# Patient Record
Sex: MALE | Race: WHITE | NOT HISPANIC OR LATINO | Employment: FULL TIME
[De-identification: names, ages, dates, MRNs, and addresses within clinical notes are randomized per-mention and may not be internally consistent; named-entity substitution may affect disease eponyms.]

---

## 2017-02-24 ENCOUNTER — RECORDS - HEALTHEAST (OUTPATIENT)
Dept: ADMINISTRATIVE | Facility: OTHER | Age: 47
End: 2017-02-24

## 2017-05-19 ENCOUNTER — OFFICE VISIT - HEALTHEAST (OUTPATIENT)
Dept: FAMILY MEDICINE | Facility: CLINIC | Age: 47
End: 2017-05-19

## 2017-05-19 DIAGNOSIS — M24.159 LABRAL TEAR OF HIP, DEGENERATIVE: ICD-10-CM

## 2017-05-19 DIAGNOSIS — Z01.818 PRE-OP EVALUATION: ICD-10-CM

## 2017-05-19 DIAGNOSIS — Z13.220 SCREENING FOR LIPID DISORDERS: ICD-10-CM

## 2017-05-19 LAB
CHOLEST SERPL-MCNC: 287 MG/DL
FASTING STATUS PATIENT QL REPORTED: NO
HDLC SERPL-MCNC: 47 MG/DL
LDLC SERPL CALC-MCNC: 189 MG/DL
TRIGL SERPL-MCNC: 255 MG/DL

## 2017-05-19 ASSESSMENT — MIFFLIN-ST. JEOR: SCORE: 1880.04

## 2017-05-22 ASSESSMENT — MIFFLIN-ST. JEOR: SCORE: 1850.55

## 2017-05-23 ENCOUNTER — SURGERY - HEALTHEAST (OUTPATIENT)
Dept: SURGERY | Facility: CLINIC | Age: 47
End: 2017-05-23

## 2017-05-23 ENCOUNTER — ANESTHESIA - HEALTHEAST (OUTPATIENT)
Dept: SURGERY | Facility: CLINIC | Age: 47
End: 2017-05-23

## 2017-06-07 ENCOUNTER — RECORDS - HEALTHEAST (OUTPATIENT)
Dept: ADMINISTRATIVE | Facility: OTHER | Age: 47
End: 2017-06-07

## 2017-07-12 ENCOUNTER — RECORDS - HEALTHEAST (OUTPATIENT)
Dept: ADMINISTRATIVE | Facility: OTHER | Age: 47
End: 2017-07-12

## 2017-08-16 ENCOUNTER — RECORDS - HEALTHEAST (OUTPATIENT)
Dept: ADMINISTRATIVE | Facility: OTHER | Age: 47
End: 2017-08-16

## 2021-05-30 VITALS — HEIGHT: 72 IN | WEIGHT: 210 LBS | BODY MASS INDEX: 28.44 KG/M2

## 2021-05-30 VITALS — BODY MASS INDEX: 30.8 KG/M2 | HEIGHT: 71 IN | WEIGHT: 220 LBS

## 2021-06-01 ENCOUNTER — RECORDS - HEALTHEAST (OUTPATIENT)
Dept: ADMINISTRATIVE | Facility: CLINIC | Age: 51
End: 2021-06-01

## 2021-06-10 NOTE — PROGRESS NOTES
Assessment/Plan:     1. Pre-op evaluation/ 2. Labral tear of hip, degenerative  Preoperative exam completed today for a left hip arthroscopically secondary to labral tear. Patient is low risk for complications related to planned procedure, would recommend proceeding as scheduled without further clinical clarification. Patient is asymptomatic in terms of chest pain or shortness of breath, no cardiac history. Labs as below are pending as of this dictation however will be available to review in epic.  Postoperative management of the patient will be completed by the surgeon.  - Hemoglobin  - Basic Metabolic Panel      3. Screening for lipid disorders  Patient has not had cholesterol screening completed in the past.  This will be completed today as well.  - Lipid Cascade      Subjective:     Scheduled Procedure: Left Hip Arthroscopy, pincer resection, labral repair, cam osteoplasty, capsular closure.   Surgery Date:  5/23/17  Surgery Location:  [facility]  Surgeon:  Dr. Marques    Symptoms started about 8 years ago with left hip pain. He had done symptomatic management and physical therapy. He got an MRI and was found to have a Labral tear. It was determined that surgery was necessary.     No current outpatient prescriptions on file.     No current facility-administered medications for this visit.        Allergies   Allergen Reactions     Penicillins Rash       Immunization History   Administered Date(s) Administered     Tdap 06/22/2013       Patient Active Problem List   Diagnosis     Rotator cuff syndrome, left       Past Medical History:   Diagnosis Date     DVT (deep venous thrombosis) 2009    no clotting disorders. Had a Stent placed and since removed. on blood thinners for 6 months.      Labral tear of hip, degenerative        Social History     Social History     Marital status: Single     Spouse name: N/A     Number of children: N/A     Years of education: N/A     Occupational History     Not on file.     Social  "History Main Topics     Smoking status: Former Smoker     Types: Cigarettes     Quit date: 7/1/2015     Smokeless tobacco: Not on file     Alcohol use Yes     Drug use: No     Sexual activity: Not on file     Other Topics Concern     Not on file     Social History Narrative       Past Surgical History:   Procedure Laterality Date     IVC FILTER RETRIEVAL       SURGERY SCROTAL / TESTICULAR  1977       History of Present Illness  Recent Health  Fever: no  Chills: no  Fatigue: no  Chest Pain: no  Cough: no  Dyspnea: no  Urinary Frequency: no  Nausea: no  Vomiting: no  Diarrhea: no  Abdominal Pain: no  Easy Bruising: no  Lower Extremity Swelling: no  Poor Exercise Tolerance: no    Most recent Health Maintenance Visit:  long time ago. Has had visits with PCP in the last 6 months     Pertinent History  Prior Anesthesia: yes  Previous Anesthesia Reaction:  no  Diabetes: no  Cardiovascular Disease: no  Pulmonary Disease: no  Renal Disease: no  GI Disease: no  Sleep Apnea: no  Thromboembolic Problems: no  Clotting Disorder: no  Bleeding Disorder: no  Transfusion Reaction: no history of a blood transfusion  Impaired Immunity: no  Steroid use in the last 6 months: no  Frequent Aspirin use: no    No family history of anesthesia reaction, MI, Aneurysm, sudden death, clotting disorder and bleeding disorder. Grandmother had a Stroke, lived to 95 years old. Grandparents lived into 90s.     Social history of patient does not wear denture or partial plates, there is no transfusion refusal and there are no concerns regarding care after surgery    After surgery, the patient plans to recover at home with family.    Review of Systems  A 12 point comprehensive review of systems was negative except as noted.          Objective:         Vitals:    05/19/17 1359   BP: 122/86   Pulse: 68   Resp: 16   Weight: 220 lb (99.8 kg)   Height: 5' 11\" (1.803 m)       Physical Exam:  General Appearance: Alert, cooperative, no distress, appears stated " age  Head: Normocephalic, without obvious abnormality, atraumatic  Eyes: PERRL, conjunctiva/corneas clear, EOM's intact  Ears: Normal TM's and external ear canals, both ears  Nose: Nares normal, septum midline,mucosa normal, no drainage  Throat: Lips, mucosa, and tongue normal; teeth and gums normal  Neck: Supple, symmetrical, trachea midline, no adenopathy;  thyroid: not enlarged, symmetric, no tenderness/mass/nodules; no carotid bruit or JVD  Back: Symmetric, no curvature, ROM normal, no CVA tenderness  Lungs: Clear to auscultation bilaterally, respirations unlabored  Heart: Regular rate and rhythm, S1 and S2 normal, no murmur, rub, or gallop,  Abdomen: Soft, non-tender, bowel sounds active all four quadrants,  no masses, no organomegaly  Genitourinary: Penis normal. Right testis is descended. Left testis is descended.   Musculoskeletal: Normal range of motion. No joint swelling or deformity.   Extremities: Extremities normal, atraumatic, no cyanosis or edema  Skin: Skin color, texture, turgor normal, no rashes or lesions  Lymph nodes: Cervical, supraclavicular, and axillary nodes normal  Neurologic: He is alert. He has normal reflexes.   Psychiatric: He has a normal mood and affect.

## 2021-06-10 NOTE — ANESTHESIA PREPROCEDURE EVALUATION
Anesthesia Evaluation      Patient summary reviewed   No history of anesthetic complications     Airway   Mallampati: II  Neck ROM: full   Pulmonary - negative ROS and normal exam    breath sounds clear to auscultation                         Cardiovascular - negative ROS and normal exam  Rhythm: regular  Rate: normal,         Neuro/Psych - negative ROS     Endo/Other - negative ROS      GI/Hepatic/Renal - negative ROS      Other findings: Hx DVT & IVC filter      Dental - normal exam                        Anesthesia Plan  Planned anesthetic: general endotracheal    ASA 2   Induction: intravenous   Anesthetic plan and risks discussed with: patient    Post-op plan: routine recovery

## 2021-06-10 NOTE — ANESTHESIA CARE TRANSFER NOTE
Last vitals:   Vitals:    05/23/17 1550   BP: 125/72   Pulse: 80   Resp: 14   Temp: 36.3  C (97.4  F)   SpO2: 100%     Patient's level of consciousness is drowsy  Spontaneous respirations: yes  Maintains airway independently: yes  Dentition unchanged: yes  Oropharynx: oropharynx clear of all foreign objects    QCDR Measures:  ASA# 20 - Surgical Safety Checklist: ASA20A - Safety Checks Done  PQRS# 430 - Adult PONV Prevention: 4558F - Pt received => 2 anti-emetic agents (different classes) preop & intraop  ASA# 8 - Peds PONV Prevention: NA - Not pediatric patient, not GA or 2 or more risk factors NOT present  PQRS# 424 - Leatha-op Temp Management: 4559F - At least one body temp DOCUMENTED => 35.5C or 95.9F within required timeframe  PQRS# 426 - PACU Transfer Protocol: - Transfer of care checklist used  ASA# 14 - Acute Post-op Pain: ASA14B - Patient did NOT experience pain >= 7 out of 10

## 2021-06-10 NOTE — ANESTHESIA POSTPROCEDURE EVALUATION
Patient: Andi Dawn  LEFT HIP ARTHROSCOPY, PINCER RESECTION, LABRAL REPAIR, CAM OSTEOPLASTY, CAPSULAR CLOSURE  Anesthesia type: general    Patient location: PACU  Last vitals:   Vitals:    05/23/17 1640   BP: 140/77   Pulse: 64   Resp: 16   Temp:    SpO2: 95%     Post vital signs: stable  Level of consciousness: awake and return to baseline  Post-anesthesia pain: pain controlled  Post-anesthesia nausea and vomiting: no  Pulmonary: unassisted, return to baseline  Cardiovascular: stable and blood pressure at baseline  Hydration: adequate  Anesthetic events: no    QCDR Measures:  ASA# 11 - Leatha-op Cardiac Arrest: ASA11B - Patient did NOT experience unanticipated cardiac arrest  ASA# 12 - Leatha-op Mortality Rate: ASA12B - Patient did NOT die  ASA# 13 - PACU Re-Intubation Rate: ASA13B - Patient did NOT require a new airway mgmt  ASA# 10 - Composite Anes Safety: ASA10A - No serious adverse event  ASA# 38 - New Corneal Injury: ASA38A - No new exposure keratitis or corneal abrasion in PACU    Additional Notes:

## 2021-09-01 ENCOUNTER — HOSPITAL ENCOUNTER (EMERGENCY)
Facility: HOSPITAL | Age: 51
Discharge: HOME | End: 2021-09-01
Attending: EMERGENCY MEDICINE | Admitting: EMERGENCY MEDICINE
Payer: COMMERCIAL

## 2021-09-01 VITALS
TEMPERATURE: 98 F | BODY MASS INDEX: 28.48 KG/M2 | HEART RATE: 98 BPM | RESPIRATION RATE: 16 BRPM | SYSTOLIC BLOOD PRESSURE: 160 MMHG | DIASTOLIC BLOOD PRESSURE: 82 MMHG | OXYGEN SATURATION: 97 % | WEIGHT: 210 LBS
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
## 2021-09-01 PROCEDURE — 99282 EMERGENCY DEPT VISIT SF MDM: CPT

## 2021-09-01 RX ORDER — CLINDAMYCIN HCL 300 MG
300 CAPSULE ORAL 3 TIMES DAILY
Qty: 21 CAPSULE | Refills: 0 | Status: SHIPPED | OUTPATIENT
Start: 2021-09-01 | End: 2021-09-08

## 2021-09-01 NOTE — ED TRIAGE NOTES
Here with family. Dog bit on abd about 20 min ago. Dog is known to Pt and UTD of vaccinations. Bandage is in place and bleeding is controlled.

## 2021-09-02 NOTE — ED PROVIDER NOTES
EMERGENCY DEPARTMENT ENCOUNTER     NAME: Andi Dawn   AGE: 50 year old male   YOB: 1970   MRN: [id number]   EVALUATION DATE & TIME: 9/1/2021  6:59 PM   PCP: Eduardo Martinez     Chief Complaint   Patient presents with     Dog Bite   :    FINAL IMPRESSION       1. Dog bite, initial encounter           ED COURSE & MEDICAL DECISION MAKING      Pertinent Labs & Imaging studies reviewed. (See chart for details)   50 year old male  presents to the Emergency Department for evaluation of a dog bite on his abdomen. Initial Vitals Reviewed. Initial exam notable for an early well-appearing male who has 2 superficial puncture wounds, 1 of which is only to the top layer of the skin and one is full-thickness through the skin but does not extend through the subcutaneous fat and I am not concerned for visceral injury.  Tetanus and dog's rabies are up-to-date.  We discussed the risks of wound closure and given that these are small we will irrigate, do dressings, and discharged with antibiotic prophylaxis but allow them to heal by secondary intention.  Patient was comfortable with this plan at time of discharge.    7:17 PM I performed my initial history and physical exam as well as discussed ED course and plan.         At the conclusion of the encounter I discussed the results of all of the tests and the disposition. The questions were answered. The patient or family acknowledged understanding and was agreeable with the care plan.         MEDICATIONS GIVEN IN THE EMERGENCY:   Medications - No data to display   NEW PRESCRIPTIONS STARTED AT TODAY'S ER VISIT   New Prescriptions    No medications on file     ================================================================   HISTORY OF PRESENT ILLNESS       Patient information was obtained from: Patient   Use of Intrepreter: N/A  Andi Dawn is a 50 year old male with no pertinent history  presents via private car for evaluation of a dog bite.    Patient reports with his family after getting bit by a dog on his upper middle abdomen 30 minutes ago(1845). He has since bandaged the bit and controlled the bleeding. Patient knows the dog and knows that it is rabies vaccinated. His tetanus is up to date (6/22/2013). He has a penicillin allergy. No other complaints at this time.     Shx: Patient is a former smoker and denies any illicit drug use.     ================================================================    REVIEW OF SYSTEMS       Review of Systems   Skin:        2 sites of puncture due to dog bit to his upper middle abdomen   All other systems reviewed and are negative.       PAST HISTORY     PAST MEDICAL HISTORY:   No past medical history on file.   PAST SURGICAL HISTORY:   Past Surgical History:   Procedure Laterality Date     IR MISCELLANEOUS PROCEDURE  12/15/2009     IR MISCELLANEOUS PROCEDURE  12/15/2009     IR MISCELLANEOUS PROCEDURE  12/16/2009     IR MISCELLANEOUS PROCEDURE  12/17/2009     IR MISCELLANEOUS PROCEDURE  12/17/2009     IR VENOUS PTA  12/17/2009     IVC FILTER RETRIEVAL       ID ARTHROSCOPY HIP W/LABRAL REPAIR Left 5/23/2017    Procedure: LEFT HIP ARTHROSCOPY, PINCER RESECTION, LABRAL REPAIR, CAM OSTEOPLASTY, CAPSULAR CLOSURE;  Surgeon: Parker Cooper MD;  Location: [facility];  Service: Orthopedics     SURGERY SCROTAL / TESTICULAR  1977      CURRENT MEDICATIONS:   enoxaparin (LOVENOX) 40 mg/0.4 mL syringe      ALLERGIES:   Allergies   Allergen Reactions     Penicillins Rash      FAMILY HISTORY:   Family History   Problem Relation Age of Onset     Diabetes Father      Cerebrovascular Disease Paternal Grandmother      Clotting Disorder No family hx of      Cancer No family hx of       SOCIAL HISTORY:   Social History     Socioeconomic History     Marital status: Single     Spouse name: Not on file     Number of children: Not on file     Years of education: Not on file     Highest education level: Not on file   Occupational  History     Not on file   Tobacco Use     Smoking status: Former Smoker     Types: Cigarettes, Cigarettes     Quit date: 2015     Years since quittin.1   Substance and Sexual Activity     Alcohol use: Not on file     Drug use: No     Sexual activity: Not on file   Other Topics Concern     Not on file   Social History Narrative     Not on file     Social Determinants of Health     Financial Resource Strain:      Difficulty of Paying Living Expenses:    Food Insecurity:      Worried About Running Out of Food in the Last Year:      Ran Out of Food in the Last Year:    Transportation Needs:      Lack of Transportation (Medical):      Lack of Transportation (Non-Medical):    Physical Activity:      Days of Exercise per Week:      Minutes of Exercise per Session:    Stress:      Feeling of Stress :    Social Connections:      Frequency of Communication with Friends and Family:      Frequency of Social Gatherings with Friends and Family:      Attends Buddhist Services:      Active Member of Clubs or Organizations:      Attends Club or Organization Meetings:      Marital Status:    Intimate Partner Violence:      Fear of Current or Ex-Partner:      Emotionally Abused:      Physically Abused:      Sexually Abused:         VITALS  Patient Vitals for the past 24 hrs:   BP Temp Temp src Pulse Resp SpO2 Weight   21 1816 (!) 160/82 98  F (36.7  C) Oral 98 16 97 % 95.3 kg (210 lb)        ================================================================    PHYSICAL EXAM     VITAL SIGNS: BP (!) 160/82   Pulse 98   Temp 98  F (36.7  C) (Oral)   Resp 16   Wt 95.3 kg (210 lb)   SpO2 97%   BMI 28.48 kg/m     Constitutional:  Awake, no acute distress   HENT:  Atraumatic, oropharynx without exudate or erythema, membranes moist  Lymph:  No adenopathy  Eyes: EOM intact, PERRL, no injection  Neck: Supple  Respiratory:  Clear to auscultation bilaterally, no wheezes or crackles   Cardiovascular:  Regular rate and rhythm,  single S1 and S2   GI:  Soft, nontender, nondistended, no rebound or guarding   Musculoskeletal:  Moves all extremities, no lower extremity edema, no deformities    Skin:  Warm, dry, 2 superficial puncture wounds to anterior abdomen, 1 cm in diameter  Neurologic:  Alert and oriented x3, no focal deficits noted       ================================================================  LAB       All pertinent labs reviewed and interpreted.   Labs Ordered and Resulted from Time of ED Arrival Up to the Time of Departure from the ED - No data to display     ===============================================================  RADIOLOGY       Reviewed all pertinent imaging. Please see official radiology report.   No orders to display         ================================================================  EKG         I have independently reviewed and interpreted the EKG(s) documented above.     ================================================================  PROCEDURES         I, Alex Mirza, am serving as a scribe to document services personally performed by Dr. Guerin based on my observation and the provider's statements to me. I, Guerita Guerin MD attest that Alex Mirza is acting in a scribe capacity, has observed my performance of the services and has documented them in accordance with my direction.   Guerita Guerin M.D.   Emergency Medicine   [facility] EMERGENCY DEPARTMENT  [address] [place] [id number]  [phone]  Dept: [phone]        Guerita Guerin MD  09/01/21 1957

## 2021-10-17 ENCOUNTER — HEALTH MAINTENANCE LETTER (OUTPATIENT)
Age: 51
End: 2021-10-17

## 2021-10-25 ENCOUNTER — APPOINTMENT (OUTPATIENT)
Dept: ULTRASOUND IMAGING | Facility: HOSPITAL | Age: 51
End: 2021-10-25
Attending: STUDENT IN AN ORGANIZED HEALTH CARE EDUCATION/TRAINING PROGRAM
Payer: COMMERCIAL

## 2021-10-25 ENCOUNTER — HOSPITAL ENCOUNTER (EMERGENCY)
Facility: HOSPITAL | Age: 51
Discharge: HOME | End: 2021-10-25
Attending: STUDENT IN AN ORGANIZED HEALTH CARE EDUCATION/TRAINING PROGRAM | Admitting: STUDENT IN AN ORGANIZED HEALTH CARE EDUCATION/TRAINING PROGRAM
Payer: COMMERCIAL

## 2021-10-25 VITALS
BODY MASS INDEX: 29.12 KG/M2 | OXYGEN SATURATION: 97 % | SYSTOLIC BLOOD PRESSURE: 127 MMHG | TEMPERATURE: 97.7 F | HEART RATE: 96 BPM | DIASTOLIC BLOOD PRESSURE: 87 MMHG | RESPIRATION RATE: 18 BRPM | HEIGHT: 72 IN | WEIGHT: 215 LBS

## 2021-10-25 DIAGNOSIS — M79.662 PAIN OF LEFT LOWER LEG: ICD-10-CM

## 2021-10-25 PROCEDURE — 93971 EXTREMITY STUDY: CPT | Mod: LT

## 2021-10-25 PROCEDURE — 99284 EMERGENCY DEPT VISIT MOD MDM: CPT | Mod: 25

## 2021-10-25 ASSESSMENT — MIFFLIN-ST. JEOR: SCORE: 1873.23

## 2021-10-25 ASSESSMENT — ENCOUNTER SYMPTOMS: SHORTNESS OF BREATH: 0

## 2021-10-25 NOTE — ED NOTES
The patient was seen in triage to expedite ED workup.     HPI: Patient presents with complaints of left leg pain and swelling for the past 3 weeks. Pain rated a 3/10. He has not been seen for this, and has been wearing compression socks. Patient with a history of blood clots post hip surgery 12 years ago.    MDM: possible DVT. US ordered.       Jennifer La MD  10/25/21 [id number]

## 2021-10-25 NOTE — DISCHARGE INSTRUCTIONS
Ultrasound did not show a DVT. Treat this is a muscle strain with rest, ice, compression, elevation, and Tylenol/ibuprofen as needed.

## 2021-10-25 NOTE — ED PROVIDER NOTES
EMERGENCY DEPARTMENT ENCOUNTER      NAME: Andi Dawn  AGE: 50 year old male  YOB: 1970  MRN: [id number]  EVALUATION DATE & TIME: No admission date for patient encounter.    PCP: Eduardo Martinez    ED PROVIDER: Jennifer La MD      Chief Complaint   Patient presents with     Leg Problem     FINAL IMPRESSION:  1. Pain of left lower leg      ED COURSE & MEDICAL DECISION MAKIN year old old male who presents to the ED for evaluation of leg pain.   History and physical examination as documented. Vital signs reassuring.  Has left lower leg pain and has h/o DVT so is concerned for that. No definitive swelling on my exam. Reassuring neurovascular exam. No trauma to the leg. US negative for DVT. Likely muscle strain. Given instructions for pain control, rest, ice, compression, and elevation. Advised f/u if not improving. Discharged in stable condition.     Scribe time stamps  3:50 PM Met with patient for initial interview and exam. Plan for care in the ED was discussed. I saw the patient while wearing a surgical mask and gloves.  3:56 PM We discussed plans for discharge including supportive cares, symptomatic treatment, outpatient follow up, and reasons to return to the emergency department.   0 minutes of critical care time     MEDICATIONS GIVEN IN THE EMERGENCY:  Medications - No data to display    NEW PRESCRIPTIONS STARTED AT TODAY'S ER VISIT  Discharge Medication List as of 10/25/2021  3:57 PM             =================================================================    HPI    Patient information was obtained from: Patient    Use of : N/A     Andi Dawn is a 50 year old male with a pertinent history of DVT and tobacco use disorder who presents to this ED via walk in for evaluation of leg pain and swelling.     Patient presents with complaints of left leg swelling and pain for the past couple of weeks. Pain is rated a 3/10. He reports swelling is mainly around  the left knee and pain is located within the left calf. He denies any injury or trauma to the leg. He is not on any anticoagulants. Patient has been wearing compression socks. He notes a history of DVT post hip surgery 12 years ago.     Patient has been ambulating fine. He denies any chest pain or shortness of breath.       REVIEW OF SYSTEMS   Review of Systems   Respiratory: Negative for shortness of breath.    Cardiovascular: Positive for leg swelling (left knee). Negative for chest pain.   Musculoskeletal:        Endorses pain in left calf   All other systems reviewed and are negative.      PAST MEDICAL HISTORY:  No past medical history on file.    PAST SURGICAL HISTORY:  Past Surgical History:   Procedure Laterality Date     IR MISCELLANEOUS PROCEDURE  12/15/2009     IR MISCELLANEOUS PROCEDURE  12/15/2009     IR MISCELLANEOUS PROCEDURE  12/16/2009     IR MISCELLANEOUS PROCEDURE  12/17/2009     IR MISCELLANEOUS PROCEDURE  12/17/2009     IR VENOUS PTA  12/17/2009     IVC FILTER RETRIEVAL       GA ARTHROSCOPY HIP W/LABRAL REPAIR Left 5/23/2017    Procedure: LEFT HIP ARTHROSCOPY, PINCER RESECTION, LABRAL REPAIR, CAM OSTEOPLASTY, CAPSULAR CLOSURE;  Surgeon: Parker Cooper MD;  Location: [facility];  Service: Orthopedics     SURGERY SCROTAL / TESTICULAR  1977       ALLERGIES:  Allergies   Allergen Reactions     Penicillins Rash       FAMILY HISTORY:  Family History   Problem Relation Age of Onset     Diabetes Father      Cerebrovascular Disease Paternal Grandmother      Clotting Disorder No family hx of      Cancer No family hx of        SOCIAL HISTORY:   Social History     Socioeconomic History     Marital status:      Spouse name: Not on file     Number of children: Not on file     Years of education: Not on file     Highest education level: Not on file   Occupational History     Not on file   Tobacco Use     Smoking status: Former Smoker     Types: Cigarettes, Cigarettes     Quit date: 7/1/2015      Years since quittin.3   Substance and Sexual Activity     Alcohol use: Not on file     Drug use: No     Sexual activity: Not on file   Other Topics Concern     Not on file   Social History Narrative     Not on file     Social Determinants of Health     Financial Resource Strain:      Difficulty of Paying Living Expenses:    Food Insecurity:      Worried About Running Out of Food in the Last Year:      Ran Out of Food in the Last Year:    Transportation Needs:      Lack of Transportation (Medical):      Lack of Transportation (Non-Medical):    Physical Activity:      Days of Exercise per Week:      Minutes of Exercise per Session:    Stress:      Feeling of Stress :    Social Connections:      Frequency of Communication with Friends and Family:      Frequency of Social Gatherings with Friends and Family:      Attends Restorationism Services:      Active Member of Clubs or Organizations:      Attends Club or Organization Meetings:      Marital Status:    Intimate Partner Violence:      Fear of Current or Ex-Partner:      Emotionally Abused:      Physically Abused:      Sexually Abused:        VITALS:  /87   Pulse 96   Temp 97.7  F (36.5  C) (Temporal)   Resp 18   Ht 1.829 m (6')   Wt 97.5 kg (215 lb)   SpO2 97%   BMI 29.16 kg/m      PHYSICAL EXAM    General: NAD.  HEENT: No external signs of head trauma.  Chest/Pulm: Breathing comfortably on room air.  CV: Warm and well perfused. Strong DP pulses.   MSK/Extremities: Actively moving all four extremities. No pedal edema. Mild tenderness in the left posterior calf.   Skin: No visible rashes  Neuro: Alert and conversant.   Psych: Behavior normal.    LAB:  All pertinent labs reviewed and interpreted.  Results for orders placed or performed during the hospital encounter of 10/25/21   US Lower Extremity Venous Duplex Left    Impression    IMPRESSION:  1.  No deep venous thrombosis in the left lower extremity.       RADIOLOGY:  Reviewed all pertinent imaging.  Please see official radiology report.  US Lower Extremity Venous Duplex Left   Final Result   IMPRESSION:   1.  No deep venous thrombosis in the left lower extremity.          PROCEDURES:   none    I, Ama Stark, am serving as a scribe to document services personally performed by Dr. Jennifer La based on my observation and the provider's statements to me. I, Jennifer La MD attest that Ama Stark is acting in a scribe capacity, has observed my performance of the services and has documented them in accordance with my direction.    Jennifer La M.D.  Emergency Medicine  [facility]         Jennifer La MD  10/25/21 8739

## 2021-12-12 ENCOUNTER — HEALTH MAINTENANCE LETTER (OUTPATIENT)
Age: 51
End: 2021-12-12

## 2022-10-01 ENCOUNTER — HEALTH MAINTENANCE LETTER (OUTPATIENT)
Age: 52
End: 2022-10-01

## 2022-11-24 ENCOUNTER — HOSPITAL ENCOUNTER (EMERGENCY)
Facility: HOSPITAL | Age: 52
Discharge: HOME | End: 2022-11-24
Admitting: NURSE PRACTITIONER
Payer: COMMERCIAL

## 2022-11-24 VITALS
RESPIRATION RATE: 22 BRPM | OXYGEN SATURATION: 97 % | HEART RATE: 106 BPM | DIASTOLIC BLOOD PRESSURE: 86 MMHG | TEMPERATURE: 98 F | SYSTOLIC BLOOD PRESSURE: 138 MMHG
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
## 2022-11-24 DIAGNOSIS — T07.XXXA MULTIPLE PUNCTURE WOUNDS: ICD-10-CM

## 2022-11-24 PROBLEM — F17.200 TOBACCO USE DISORDER: Status: ACTIVE | Noted: 2018-02-09

## 2022-11-24 PROBLEM — M19.012 OSTEOARTHRITIS OF LEFT ACROMIOCLAVICULAR JOINT: Status: ACTIVE | Noted: 2020-11-30

## 2022-11-24 PROBLEM — S73.192A LABRAL TEAR OF LEFT HIP JOINT: Status: ACTIVE | Noted: 2018-02-09

## 2022-11-24 PROBLEM — M25.562 CHRONIC PAIN OF LEFT KNEE: Status: ACTIVE | Noted: 2022-04-05

## 2022-11-24 PROBLEM — M17.12 OSTEOARTHRITIS OF LEFT PATELLOFEMORAL JOINT: Status: ACTIVE | Noted: 2022-04-05

## 2022-11-24 PROBLEM — Z47.89 AFTERCARE FOLLOWING SURGERY OF THE MUSCULOSKELETAL SYSTEM: Status: ACTIVE | Noted: 2020-07-31

## 2022-11-24 PROBLEM — M75.52 SUBACROMIAL BURSITIS OF LEFT SHOULDER JOINT: Status: ACTIVE | Noted: 2020-11-30

## 2022-11-24 PROBLEM — G89.29 CHRONIC PAIN OF LEFT KNEE: Status: ACTIVE | Noted: 2022-04-05

## 2022-11-24 PROCEDURE — 90715 TDAP VACCINE 7 YRS/> IM: CPT | Performed by: NURSE PRACTITIONER

## 2022-11-24 PROCEDURE — 90471 IMMUNIZATION ADMIN: CPT | Performed by: NURSE PRACTITIONER

## 2022-11-24 PROCEDURE — 12004 RPR S/N/AX/GEN/TRK7.6-12.5CM: CPT

## 2022-11-24 PROCEDURE — 99284 EMERGENCY DEPT VISIT MOD MDM: CPT | Mod: 25

## 2022-11-24 PROCEDURE — 250N000009 HC RX 250: Performed by: NURSE PRACTITIONER

## 2022-11-24 PROCEDURE — 250N000011 HC RX IP 250 OP 636: Performed by: NURSE PRACTITIONER

## 2022-11-24 PROCEDURE — 250N000013 HC RX MED GY IP 250 OP 250 PS 637: Performed by: NURSE PRACTITIONER

## 2022-11-24 RX ORDER — DOXYCYCLINE 100 MG/1
100 CAPSULE ORAL 2 TIMES DAILY
Qty: 14 CAPSULE | Refills: 0 | Status: SHIPPED | OUTPATIENT
Start: 2022-11-24 | End: 2022-12-01

## 2022-11-24 RX ORDER — DOXYCYCLINE 100 MG/1
100 CAPSULE ORAL EVERY 12 HOURS SCHEDULED
Status: DISCONTINUED | OUTPATIENT
Start: 2022-11-24 | End: 2022-11-25 | Stop reason: HOSPADM

## 2022-11-24 RX ORDER — OXYCODONE HYDROCHLORIDE 5 MG/1
5 TABLET ORAL ONCE
Status: COMPLETED | OUTPATIENT
Start: 2022-11-24 | End: 2022-11-24

## 2022-11-24 RX ORDER — IBUPROFEN 600 MG/1
600 TABLET, FILM COATED ORAL ONCE
Status: COMPLETED | OUTPATIENT
Start: 2022-11-24 | End: 2022-11-24

## 2022-11-24 RX ORDER — OXYCODONE HYDROCHLORIDE 5 MG/1
5 TABLET ORAL EVERY 6 HOURS PRN
Qty: 8 TABLET | Refills: 0 | Status: SHIPPED | OUTPATIENT
Start: 2022-11-24 | End: 2022-11-27

## 2022-11-24 RX ORDER — GINSENG 100 MG
CAPSULE ORAL ONCE
Status: COMPLETED | OUTPATIENT
Start: 2022-11-24 | End: 2022-11-24

## 2022-11-24 RX ADMIN — IBUPROFEN 600 MG: 600 TABLET, FILM COATED ORAL at 21:38

## 2022-11-24 RX ADMIN — BACITRACIN: 500 OINTMENT TOPICAL at 22:35

## 2022-11-24 RX ADMIN — CLOSTRIDIUM TETANI TOXOID ANTIGEN (FORMALDEHYDE INACTIVATED), CORYNEBACTERIUM DIPHTHERIAE TOXOID ANTIGEN (FORMALDEHYDE INACTIVATED), BORDETELLA PERTUSSIS TOXOID ANTIGEN (GLUTARALDEHYDE INACTIVATED), BORDETELLA PERTUSSIS FILAMENTOUS HEMAGGLUTININ ANTIGEN (FORMALDEHYDE INACTIVATED), BORDETELLA PERTUSSIS PERTACTIN ANTIGEN, AND BORDETELLA PERTUSSIS FIMBRIAE 2/3 ANTIGEN 0.5 ML: 5; 2; 2.5; 5; 3; 5 INJECTION, SUSPENSION INTRAMUSCULAR at 21:32

## 2022-11-24 RX ADMIN — OXYCODONE HYDROCHLORIDE 5 MG: 5 TABLET ORAL at 21:38

## 2022-11-24 RX ADMIN — DOXYCYCLINE HYCLATE 100 MG: 100 CAPSULE ORAL at 22:42

## 2022-11-25 NOTE — ED PROVIDER NOTES
EMERGENCY DEPARTMENT ENCOUNTER      NAME: Andi Dawn  AGE: 51 year old male  YOB: 1970  MRN: [id number]  EVALUATION DATE & TIME: 2022  9:24 PM    PCP: Eduardo Martinez    ED PROVIDER: ISAAC Youssef, CNP      Chief Complaint   Patient presents with     Dog Bite         FINAL IMPRESSION:  1. Dog bite, initial encounter    2. Multiple puncture wounds          ED COURSE & MEDICAL DECISION MAKIN:25 PM I met with the patient, obtained history, performed an initial exam, and discussed options and plan for treatment here in the ED.  10:00 PM laceration repair    Pertinent Labs & Imaging studies reviewed. (See chart for details)  51 year old male presents to the Emergency Department for evaluation of dog bite wounds. Noted to have multiple puncture wounds. Lacerations on the right forearm. No foreign bodies noted. CMS intact. Tetanus updated. Low suspicion that bite wounds on the chest would have punctured the pleura. Patient did decline CXR. Laceration were irrigated and surgically repaired. Will be placed on 7 day course of doxycycline. Given instructions regarding ongoing wound management. Suture removal in clinic in 10 days. If infection develops, advised to return to the ED.     At the conclusion of the encounter I discussed the results of all of the tests and the disposition. The questions were answered. The patient or family acknowledged understanding and was agreeable with the care plan.         MEDICATIONS GIVEN IN THE EMERGENCY:  Medications   doxycycline hyclate (VIBRAMYCIN) capsule 100 mg (has no administration in time range)   Tdap (tetanus-diphtheria-acell pertussis) (ADACEL) injection 0.5 mL (0.5 mLs Intramuscular Given 22)   ibuprofen (ADVIL/MOTRIN) tablet 600 mg (600 mg Oral Given 22)   oxyCODONE (ROXICODONE) tablet 5 mg (5 mg Oral Given 22)   bacitracin ointment ( Topical Given 22)       NEW PRESCRIPTIONS  STARTED AT TODAY'S ER VISIT  New Prescriptions    DOXYCYCLINE HYCLATE (VIBRAMYCIN) 100 MG CAPSULE    Take 1 capsule (100 mg) by mouth 2 times daily for 7 days    OXYCODONE (ROXICODONE) 5 MG TABLET    Take 1 tablet (5 mg) by mouth every 6 hours as needed for severe pain (7-10)            =================================================================    HPI    Patient information was obtained from: patient    Use of Intrepreter: N/A        Andi Dawn is a 51 year old male who presents for evaluation of dog bites. He was bitten by his own dogs just prior to arrival. Dogs are up to date with immunizations. His dogs were fighting when he attempted to break things up. He notes bite wounds to the left upper chest, left finger finger, and right forearm. Has moderate pain. Worse with movement. No weakness or sensory changes. His last tetanus was in 2013.       REVIEW OF SYSTEMS   ROS negative except as noted in the HPI.    PAST MEDICAL HISTORY:  Past Medical History:   Diagnosis Date     Aftercare following surgery of the musculoskeletal system 7/31/2020     Chronic pain of left knee 4/5/2022     Labral tear of left hip joint 2/9/2018     Osteoarthritis of left acromioclavicular joint 11/30/2020     Osteoarthritis of left patellofemoral joint 4/5/2022     Personal history of DVT (deep vein thrombosis) 6/16/2011    Formatting of this note might be different from the original. 2010-Hypercoagulable work up negative.     Rotator cuff syndrome, left 7/27/2016     Subacromial bursitis of left shoulder joint 11/30/2020     Tobacco use disorder 2/9/2018       PAST SURGICAL HISTORY:  Past Surgical History:   Procedure Laterality Date     IR MISCELLANEOUS PROCEDURE  12/15/2009     IR MISCELLANEOUS PROCEDURE  12/15/2009     IR MISCELLANEOUS PROCEDURE  12/16/2009     IR MISCELLANEOUS PROCEDURE  12/17/2009     IR MISCELLANEOUS PROCEDURE  12/17/2009     IR VENOUS PTA  12/17/2009     IVC FILTER RETRIEVAL       FL ARTHROSCOPY  HIP W/LABRAL REPAIR Left 2017    Procedure: LEFT HIP ARTHROSCOPY, PINCER RESECTION, LABRAL REPAIR, CAM OSTEOPLASTY, CAPSULAR CLOSURE;  Surgeon: Parker Cooper MD;  Location: [facility];  Service: Orthopedics     SURGERY SCROTAL / TESTICULAR  1977           CURRENT MEDICATIONS:    Current Facility-Administered Medications   Medication     doxycycline hyclate (VIBRAMYCIN) capsule 100 mg     Current Outpatient Medications   Medication     doxycycline hyclate (VIBRAMYCIN) 100 MG capsule     oxyCODONE (ROXICODONE) 5 MG tablet     enoxaparin (LOVENOX) 40 mg/0.4 mL syringe         ALLERGIES:  Allergies   Allergen Reactions     Penicillins Rash       FAMILY HISTORY:  Family History   Problem Relation Age of Onset     Diabetes Father      Cerebrovascular Disease Paternal Grandmother      Clotting Disorder No family hx of      Cancer No family hx of        SOCIAL HISTORY:   Social History     Socioeconomic History     Marital status:    Tobacco Use     Smoking status: Former     Types: Cigarettes     Quit date: 2015     Years since quittin.4   Substance and Sexual Activity     Drug use: No         VITALS:  Patient Vitals for the past 24 hrs:   BP Temp Temp src Pulse Resp SpO2   22 2117 138/86 98  F (36.7  C) Oral 106 22 97 %       PHYSICAL EXAM    Constitutional:  Well developed, well nourished, no acute distress  EYES: Conjunctivae clear  HENT:  Atraumatic, normocephalic  Respiratory:  No respiratory distress, normal breath sounds. Superficial puncture wounds left upper chest in the area of the pectoralis muscle. Surrounding tenderness. Minimal bleeding.  Cardiovascular:  Normal rate, normal rhythm, no murmurs  Musculoskeletal:  ROM in the upper extremities intact. Able to move all digits. All 3 layers of tendon function intact. Intrinsic muscle function of the right and left thumb intact. Motor and sensory function in the radial, medial, and ulnar distributions intact bilaterally.    Integument:  Puncture wounds to the chest as noted above. Puncture wound left ring finger. 3 laceration around the right elbow. Laceration directly over the elbow is curbed and 4 CM in length. Second laceration distal to this is 5 CM and linear. 3rd laceration above this is stellate and 3 CM in length. All extend into subcutaneous tissue. No tendon visible.  Explored to base in a bloodless field and no foreign body seen.   Neurologic:  Alert & oriented x 3. 5/5 strength right triceps and biceps. 5/5 equal .              LAB:  All pertinent labs reviewed and interpreted.  Labs Ordered and Resulted from Time of ED Arrival to Time of ED Departure - No data to display      RADIOLOGY:  Reviewed all pertinent imaging. Please see official radiology report.  No orders to display             PROCEDURES:   PROCEDURE: Laceration Repair   INDICATIONS: Laceration   PROCEDURE PROVIDER: Duglas Jesus CNP   SITE: Right elbow   TYPE/SIZE: subcutaneous, clean and no foreign body visualized  4 cm (total length)   FUNCTIONAL ASSESSMENT: Distal sensation, circulation, motor and tendon function intact   MEDICATION: 4 mLs of 1% Lidocaine without epinephrine   PREPARATION: scrubbing and irrigation with Normal saline and Betadine   DEBRIDEMENT: no debridement and wound explored, no foreign body found   CLOSURE:  Superficial layer closed with 6 stitches of 4-0 Ethilon simple interrupted    Total number of sutures/staples placed: 6     PROCEDURE: Laceration Repair   INDICATIONS: Laceration   PROCEDURE PROVIDER: ISAAC Casiano CNP    SITE: Right forearm   TYPE/SIZE: subcutaneous, clean and no foreign body visualized  5 cm (total length)   FUNCTIONAL ASSESSMENT: Distal sensation, circulation, motor and tendon function intact   MEDICATION: 5 mLs of 1% Lidocaine without epinephrine   PREPARATION: scrubbing and irrigation with Normal saline and Betadine   DEBRIDEMENT: no debridement and wound explored, no foreign body found    CLOSURE:  Superficial layer closed with 8 stitches of 4-0 Ethilon simple interrupted    Total number of sutures/staples placed: 8     PROCEDURE: Laceration Repair   INDICATIONS: Laceration   PROCEDURE PROVIDER: Duglas Jesus CNP   SITE: Right forearm   TYPE/SIZE: subcutaneous, clean and no foreign body visualized  3 cm (total length)   FUNCTIONAL ASSESSMENT: Distal sensation, circulation, motor and tendon function intact   MEDICATION: 4 mLs of 1% Lidocaine without epinephrine   PREPARATION: scrubbing and irrigation with Normal saline and Betadine   DEBRIDEMENT: no debridement and wound explored, no foreign body found   CLOSURE:  Superficial layer closed with 6 stitches of 4-0 Prolene simple interrupted    Total number of sutures/staples placed: 6               ISAAC Youssef, CNP  Emergency Medicine  [facility] EMERGENCY DEPARTMENT  [address] [place] [id number]  [phone]  Dept: [phone]         Duglas Jesus APRN CNP  11/24/22 5900

## 2022-11-25 NOTE — ED TRIAGE NOTES
2100 patient and spouse were attempting to break up dog fight and both sustained multiple dog bites. Dogs known and up to date on shots. Bleeding in waiting room and nurse wrapped with dry dressing controlled at this time. Bites to both hands, right arm, left chest.      Triage Assessment     Row Name 11/24/22 2120       Triage Assessment (Adult)    Airway WDL WDL       Respiratory WDL    Respiratory WDL WDL       Skin Circulation/Temperature WDL    Skin Circulation/Temperature WDL X  dog bites       Cardiac WDL    Cardiac WDL WDL       Peripheral/Neurovascular WDL    Peripheral Neurovascular WDL WDL       Cognitive/Neuro/Behavioral WDL    Cognitive/Neuro/Behavioral WDL WDL

## 2022-11-25 NOTE — DISCHARGE INSTRUCTIONS
We cleaned and closed the wound in the ER today.      TO CARE FOR THE WOUND AT HOME:  Keep the wound CLEAN,DRY and COVERED until the sutures are out.  You should take ibuprofen, 600mg, three times per day as needed for pain.  You can also use acetaminophen, 650 mg,up to four times per day as needed for pain.  You can use these medications together, there are no concerning interactions.  If this does not adequately control your pain, you can use 1 tabs of the prescribed oxycodone every6 hours as needed for uncontrolled pain.  If you use this medication, you should not drive or perform other activities that require full attention as this medication may make you drowsy. oxycodone like all opiate painmedications, is potentially habit forming and you should only use the minimum amount necessary to control your pain.    These wounds are high risk for infection. Take the doxycycline as prescribed.    Watch for signs of infection (redness, increasing pain or yellow drainage) and if they develop, come back to the Emergency Department immediately for treatment.  Follow up in clinic in 10 days for sutureremoval.   ---------------------------------------------------------------------------------------------------       oral

## 2023-02-05 ENCOUNTER — HEALTH MAINTENANCE LETTER (OUTPATIENT)
Age: 53
End: 2023-02-05

## 2024-03-03 ENCOUNTER — HEALTH MAINTENANCE LETTER (OUTPATIENT)
Age: 54
End: 2024-03-03

## 2025-03-16 ENCOUNTER — HEALTH MAINTENANCE LETTER (OUTPATIENT)
Age: 55
End: 2025-03-16